# Patient Record
Sex: MALE | Race: WHITE | NOT HISPANIC OR LATINO | URBAN - METROPOLITAN AREA
[De-identification: names, ages, dates, MRNs, and addresses within clinical notes are randomized per-mention and may not be internally consistent; named-entity substitution may affect disease eponyms.]

---

## 2024-01-24 ENCOUNTER — APPOINTMENT (RX ONLY)
Dept: URBAN - METROPOLITAN AREA CLINIC 58 | Facility: CLINIC | Age: 69
Setting detail: DERMATOLOGY
End: 2024-01-24

## 2024-01-24 DIAGNOSIS — L82.1 OTHER SEBORRHEIC KERATOSIS: ICD-10-CM | Status: STABLE

## 2024-01-24 DIAGNOSIS — T07XXXA INSECT BITE, NONVENOMOUS, OF OTHER, MULTIPLE, AND UNSPECIFIED SITES, WITHOUT MENTION OF INFECTION: ICD-10-CM | Status: STABLE

## 2024-01-24 DIAGNOSIS — B37.2 CANDIDIASIS OF SKIN AND NAIL: ICD-10-CM | Status: INADEQUATELY CONTROLLED

## 2024-01-24 DIAGNOSIS — L82.0 INFLAMED SEBORRHEIC KERATOSIS: ICD-10-CM | Status: INADEQUATELY CONTROLLED

## 2024-01-24 DIAGNOSIS — L73.8 OTHER SPECIFIED FOLLICULAR DISORDERS: ICD-10-CM | Status: STABLE

## 2024-01-24 PROBLEM — S80.861A INSECT BITE (NONVENOMOUS), RIGHT LOWER LEG, INITIAL ENCOUNTER: Status: ACTIVE | Noted: 2024-01-24

## 2024-01-24 PROCEDURE — ? COUNSELING

## 2024-01-24 PROCEDURE — ? PRESCRIPTION

## 2024-01-24 PROCEDURE — ? LIQUID NITROGEN

## 2024-01-24 PROCEDURE — ? PRESCRIPTION MEDICATION MANAGEMENT

## 2024-01-24 PROCEDURE — 17110 DESTRUCTION B9 LES UP TO 14: CPT

## 2024-01-24 PROCEDURE — 99203 OFFICE O/P NEW LOW 30 MIN: CPT | Mod: 25

## 2024-01-24 RX ORDER — KETOCONAZOLE 20 MG/G
1 CREAM TOPICAL
Qty: 60 | Refills: 3 | Status: ERX | COMMUNITY
Start: 2024-01-24

## 2024-01-24 RX ADMIN — KETOCONAZOLE 1: 20 CREAM TOPICAL at 00:00

## 2024-01-24 ASSESSMENT — LOCATION DETAILED DESCRIPTION DERM
LOCATION DETAILED: RIGHT INFERIOR CENTRAL MALAR CHEEK
LOCATION DETAILED: LEFT SUPERIOR ANTERIOR NECK
LOCATION DETAILED: LEFT INFERIOR CENTRAL MALAR CHEEK
LOCATION DETAILED: RIGHT PROXIMAL PRETIBIAL REGION
LOCATION DETAILED: LEFT ANTERIOR MEDIAL PROXIMAL UPPER ARM
LOCATION DETAILED: INFERIOR MIDLINE SCROTUM
LOCATION DETAILED: LEFT SUPERIOR MEDIAL MALAR CHEEK

## 2024-01-24 ASSESSMENT — LOCATION SIMPLE DESCRIPTION DERM
LOCATION SIMPLE: LEFT CHEEK
LOCATION SIMPLE: MIDLINE SCROTUM
LOCATION SIMPLE: RIGHT CHEEK
LOCATION SIMPLE: RIGHT PRETIBIAL REGION
LOCATION SIMPLE: LEFT ANTERIOR NECK
LOCATION SIMPLE: LEFT UPPER ARM

## 2024-01-24 ASSESSMENT — LOCATION ZONE DERM
LOCATION ZONE: SCROTUM
LOCATION ZONE: NECK
LOCATION ZONE: FACE
LOCATION ZONE: ARM
LOCATION ZONE: LEG

## 2024-01-24 ASSESSMENT — PAIN INTENSITY VAS
HOW INTENSE IS YOUR PAIN 0 BEING NO PAIN, 10 BEING THE MOST SEVERE PAIN POSSIBLE?: NO PAIN
HOW INTENSE IS YOUR PAIN 0 BEING NO PAIN, 10 BEING THE MOST SEVERE PAIN POSSIBLE?: 4/10 PAIN

## 2024-01-24 NOTE — PROCEDURE: PRESCRIPTION MEDICATION MANAGEMENT
Initiate Treatment: ketoconazole 2 % topical cream
Render In Strict Bullet Format?: No
Detail Level: Simple
